# Patient Record
Sex: FEMALE | Race: OTHER | NOT HISPANIC OR LATINO | URBAN - METROPOLITAN AREA
[De-identification: names, ages, dates, MRNs, and addresses within clinical notes are randomized per-mention and may not be internally consistent; named-entity substitution may affect disease eponyms.]

---

## 2020-04-21 ENCOUNTER — EMERGENCY (EMERGENCY)
Facility: HOSPITAL | Age: 70
LOS: 1 days | Discharge: HOME | End: 2020-04-21
Attending: EMERGENCY MEDICINE
Payer: COMMERCIAL

## 2020-04-21 VITALS
RESPIRATION RATE: 16 BRPM | HEIGHT: 68 IN | HEART RATE: 66 BPM | TEMPERATURE: 98 F | OXYGEN SATURATION: 98 % | DIASTOLIC BLOOD PRESSURE: 68 MMHG | WEIGHT: 138.89 LBS | SYSTOLIC BLOOD PRESSURE: 134 MMHG

## 2020-04-21 PROCEDURE — 90471 IMMUNIZATION ADMIN: CPT

## 2020-04-21 PROCEDURE — 99284 EMERGENCY DEPT VISIT MOD MDM: CPT | Mod: 25

## 2020-04-21 PROCEDURE — 72125 CT NECK SPINE W/O DYE: CPT

## 2020-04-21 PROCEDURE — 99285 EMERGENCY DEPT VISIT HI MDM: CPT | Mod: 25

## 2020-04-21 PROCEDURE — 12002 RPR S/N/AX/GEN/TRNK2.6-7.5CM: CPT

## 2020-04-21 PROCEDURE — 70450 CT HEAD/BRAIN W/O DYE: CPT

## 2020-04-21 PROCEDURE — 72125 CT NECK SPINE W/O DYE: CPT | Mod: 26

## 2020-04-21 PROCEDURE — 70450 CT HEAD/BRAIN W/O DYE: CPT | Mod: 26

## 2020-04-21 PROCEDURE — 99053 MED SERV 10PM-8AM 24 HR FAC: CPT

## 2020-04-21 PROCEDURE — 90715 TDAP VACCINE 7 YRS/> IM: CPT

## 2020-04-21 RX ORDER — TETANUS TOXOID, REDUCED DIPHTHERIA TOXOID AND ACELLULAR PERTUSSIS VACCINE, ADSORBED 5; 2.5; 8; 8; 2.5 [IU]/.5ML; [IU]/.5ML; UG/.5ML; UG/.5ML; UG/.5ML
0.5 SUSPENSION INTRAMUSCULAR ONCE
Refills: 0 | Status: COMPLETED | OUTPATIENT
Start: 2020-04-21 | End: 2020-04-21

## 2020-04-21 RX ADMIN — TETANUS TOXOID, REDUCED DIPHTHERIA TOXOID AND ACELLULAR PERTUSSIS VACCINE, ADSORBED 0.5 MILLILITER(S): 5; 2.5; 8; 8; 2.5 SUSPENSION INTRAMUSCULAR at 23:46

## 2020-04-21 NOTE — ED PROVIDER NOTE - CLINICAL SUMMARY MEDICAL DECISION MAKING FREE TEXT BOX
Angelo: Patient s/p trip and fall backwards at home 8 hours prior. no n/v. no dizziness. no preceeding symptoms. laceration to back of head with minimal bleeding. no loc. not on AC. will get ct head/c-spine, tetanus, staples for laceration to scalp, d/c home likely. no focal findings. GCS 15.

## 2020-04-21 NOTE — ED PROVIDER NOTE - MUSCULOSKELETAL, MLM
No midline spinal tenderness, NEXUS negative.  Chest and pelvis non tender and stable.  b/l UE's with full ROM and non tender throughout.  b/l LE's full ROM NT throughout.

## 2020-04-21 NOTE — ED ADULT NURSE NOTE - OBJECTIVE STATEMENT
70 yo f arrived to the ed c/o mechanical trip and fall with lac to the back on the head; no loc, not on blood thinners

## 2020-04-21 NOTE — ED PROVIDER NOTE - NSFOLLOWUPINSTRUCTIONS_ED_ALL_ED_FT
1- Keep area dry for 24 hours, after was gently with soap and water  2- Return to ER, Urgent Care, or your primary doctor for staple removal in 7 days  3- Tylenol as needed for pain  4- If you have any redness, pus, fevers, chills, worsening pain, numbness/weakness, nausea/vomtiing, or any other concerns come back to the ER immediately

## 2020-04-21 NOTE — ED PROVIDER NOTE - OBJECTIVE STATEMENT
69 y.o. female no PMHx coming in with a laceration to the back of her head after a mechanical trip and fall around 330PM today.  Pt was in her home was cleaning, fell backwards and hit the back of her head on a door jam./  No LOC.  No other injury.  NO headaches, visual changes, nausea or vomiting.  No neck or back pain.  No numbness or weakness.  Had gotten bleeding under control with bandage but was concerned about the size of the laceration and brought her to the ED.  Unsure of last tetanus.

## 2023-12-07 ENCOUNTER — EMERGENCY (EMERGENCY)
Facility: HOSPITAL | Age: 73
LOS: 1 days | Discharge: HOME | End: 2023-12-07
Attending: EMERGENCY MEDICINE
Payer: COMMERCIAL

## 2023-12-07 VITALS
OXYGEN SATURATION: 97 % | TEMPERATURE: 98 F | SYSTOLIC BLOOD PRESSURE: 116 MMHG | RESPIRATION RATE: 16 BRPM | HEART RATE: 70 BPM | DIASTOLIC BLOOD PRESSURE: 75 MMHG

## 2023-12-07 VITALS
RESPIRATION RATE: 18 BRPM | WEIGHT: 143.08 LBS | TEMPERATURE: 97 F | HEART RATE: 66 BPM | DIASTOLIC BLOOD PRESSURE: 80 MMHG | SYSTOLIC BLOOD PRESSURE: 136 MMHG | HEIGHT: 68 IN | OXYGEN SATURATION: 99 %

## 2023-12-07 PROCEDURE — 25605 CLTX DST RDL FX/EPHYS SEP W/: CPT | Mod: LT

## 2023-12-07 PROCEDURE — 99285 EMERGENCY DEPT VISIT HI MDM: CPT

## 2023-12-07 PROCEDURE — 73080 X-RAY EXAM OF ELBOW: CPT | Mod: 26,LT

## 2023-12-07 PROCEDURE — 73080 X-RAY EXAM OF ELBOW: CPT

## 2023-12-07 PROCEDURE — 73090 X-RAY EXAM OF FOREARM: CPT

## 2023-12-07 PROCEDURE — 73120 X-RAY EXAM OF HAND: CPT

## 2023-12-07 PROCEDURE — 73090 X-RAY EXAM OF FOREARM: CPT | Mod: 26,LT,76

## 2023-12-07 PROCEDURE — 73110 X-RAY EXAM OF WRIST: CPT

## 2023-12-07 PROCEDURE — 73110 X-RAY EXAM OF WRIST: CPT | Mod: 26,LT,76

## 2023-12-07 PROCEDURE — 99285 EMERGENCY DEPT VISIT HI MDM: CPT | Mod: 25

## 2023-12-07 PROCEDURE — 73120 X-RAY EXAM OF HAND: CPT | Mod: 26,LT

## 2023-12-07 RX ORDER — ACETAMINOPHEN 500 MG
975 TABLET ORAL ONCE
Refills: 0 | Status: COMPLETED | OUTPATIENT
Start: 2023-12-07 | End: 2023-12-07

## 2023-12-07 RX ORDER — LIDOCAINE HYDROCHLORIDE AND EPINEPHRINE 10; 10 MG/ML; UG/ML
10 INJECTION, SOLUTION INFILTRATION; PERINEURAL ONCE
Refills: 0 | Status: DISCONTINUED | OUTPATIENT
Start: 2023-12-07 | End: 2023-12-11

## 2023-12-07 RX ADMIN — Medication 975 MILLIGRAM(S): at 16:18

## 2023-12-07 NOTE — ED PROVIDER NOTE - PHYSICAL EXAMINATION
CONSTITUTIONAL: awake; in no acute distress.   SKIN: warm, dry, no abrasions or lacerations  HEAD: Normocephalic; atraumatic.  EYES: no conjunctival injection. PERRL. no scleral icterus  ENT: No nasal discharge; airway clear.  NECK: Supple; non tender.  CARD: S1, S2 normal; no murmurs, gallops, or rubs. Regular rate and rhythm.   RESP: No wheezes, rales or rhonchi. Good air movement bilaterally.   ABD: soft ntnd, no guarding, no distention, no rigidity.   MSK: Extremities sig for R wrist with pain to lateral wrist including snuffbox, without overlying skin change, remainder of extremities without bony deformity or tenderness to palpation, full range of motion active and passive, with intact pulses, sensation, strength throughout.  Chest wall with clavicles intact, no rib tenderness or bony deformity.  Pelvis stable without tenderness.  Midline cervical, thoracic, lumbar spine without tenderness or step-offs.   NEURO: alert, oriented to ppte, cn 2-12 intact, motor strength 5/5 throughout, sensation intact throughout, coordination intact, gait normal   PSYCH: Cooperative, appropriate.

## 2023-12-07 NOTE — ED PROVIDER NOTE - CCCP TRG CHIEF CMPLNT
L hand inj [FreeTextEntry1] : VISION SCREENING\par HEALTHY DIET AND LIFESTYLE MODIFICATIONS\par HEALTHY WEIGHT LOSS \par CHECK ROUTINE LAB WORK\par VACCINATIONS REVIEWED: FLU, COVID, TDAP, SHINGRIX\par FOLLOW-UP ALL SPECIALISTS AS DIRECTED \par \par CONCERN FOR INFECTION FINGER DISCUSSED; ARRANGED FOR STAT APPOINTMENT WITH HAND SURGEON TOMORROW.  TO ER SOONER WITH ANY WORSENING, FEVER, ETC.  WARM SOAKS.  KEEP CLEAN AND DRY\par DERMATOLOGY EVALUATION OF PROBABLE WARTS FINGERS\par MONITOR LIPIDS AND BLOOD PRESSURE\par CARDIOLOGY CONSULTANT NOTE APPRECIATED AS WELL AS PRIOR LABS AND IMAGING\par ASTHMA ACTION PLAN REVIEWED\par NEED MOST RECENT MAMMOGRAM AND COLONOSCOPY\par CALL WITH ANY QUESTIONS, CONCERNS OR CHANGES

## 2023-12-07 NOTE — ED PROVIDER NOTE - PROGRESS NOTE DETAILS
Leandro Marino MD PGY2: Ortho at bedside for reduction. Leandro Marino MD PGY2: CT performed. Ortho seen images, rec outpt follow up Daubs. Discussed with pt, dc, follow up, return precautions. Understands and is amenable at this time Leandro Marino MD PGY2: Ortho reduced at bedside. Post reduction XR performed. Ortho seen images, rec outpt follow up Daubs. Discussed with pt, dc, follow up, return precautions. Understands and is amenable at this time

## 2023-12-07 NOTE — ED PROVIDER NOTE - CARE PROVIDERS DIRECT ADDRESSES
Spoke with patient regarding message below. Patient feels that applying the 2 pain patches at the same time has really helped her pain level. She would like to know if it is okay for her to continue doing this long term since it is helping? Or if you only recommend this for a short period of time? Please advise.     Patient aware PCP gone for the day and is okay with waiting until next week for a call back.    No - the patient is unable to be screened due to medical condition ,DirectAddress_Unknown

## 2023-12-07 NOTE — ED PROVIDER NOTE - OBJECTIVE STATEMENT
Patient is a 73-year-old female past medical history CLL presenting for fall and head injury.  Patient states that she was at [place] approximately 1 to 1-1/2 hours prior to arrival in the emergency department and she fell when tripping over the carpet/rubber of the floor and fell onto her left hand.  States that she did not head, did not lose consciousness, had initial pain to her left hand and nowhere else.  Was able to get up and walk without any lower extremity or hip pain.  Now states that pain is worse to the thumb side of the back of her left wrist.  That she can feel and move her fingers though it causes her pain in her wrist.  No tingling.  Otherwise no pain to other extremities, trunk, hips, neck, head.  No vision changes, nausea, vomiting, dizziness, weakness. Patient is a 73-year-old female past medical history CLL presenting for fall and head injury.  Patient states that she was at [place] approximately 1 to 1-1/2 hours prior to arrival in the emergency department and she fell when tripping over the carpet/rubber of the floor and fell onto her left hand.  States that she did not head, did not lose consciousness, had initial pain to her left hand and nowhere else.  Was able to get up and walk without any lower extremity or hip pain.  Now states that pain is worse to the thumb side of the back of her left wrist.  That she can feel and move her fingers though it causes her pain in her wrist.  No tingling.  Otherwise no pain to other extremities, trunk, hips, neck, head.  No vision changes, nausea, vomiting, dizziness, weakness.

## 2023-12-07 NOTE — ED PROVIDER NOTE - ATTENDING CONTRIBUTION TO CARE
73-year-old female past medical history CLL presenting for fall and head injury.  Patient had a mechanical fall at the [employer] complaining of left-sided wrist pain with deformity noted rule out fracture neurovascular intact analgesia prescribed likely need splinting versus Ortho for reduction.

## 2023-12-07 NOTE — ED PROVIDER NOTE - CARE PROVIDER_API CALL
Sam Boyer  Orthopaedic Surgery  [address], [address] [place] [address]  Phone: [phone]  Fax: [phone]  Follow Up Time:    Sam Boyer  Orthopaedic Surgery  [address] [facility] [place] [address]  Phone: [phone]  Fax: [phone]  Follow Up Time:

## 2023-12-07 NOTE — ED PROVIDER NOTE - CLINICAL SUMMARY MEDICAL DECISION MAKING FREE TEXT BOX
Patient is a 73-year-old female past medical history CLL presenting for fall and hand injury.  Currently with vital signs within normal limits and stable.  Presenting for fall onto her outstretched right hand, now with right wrist pain, distal extremity is neurovascularly intact with good pulses and good sensation.  Without any obvious traumatic findings elsewhere on exam.  Mechanical fall without any concern for syncope or seizure.  Has not taken analgesia yet, to give analgesia.  To eval with x-ray, for fracture versus dislocation, regardless of results with pain to snuffbox we will put in splint and give Ortho follow-up.

## 2023-12-07 NOTE — ED PROVIDER NOTE - PATIENT PORTAL LINK FT
You can access the FollowMyHealth Patient Portal offered by [facility] by registering at the following website: [email or web address]. By joining "[employer]’s FollowMyHealth portal, you will also be able to view your health information using other applications (apps) compatible with our system. You can access the FollowMyHealth Patient Portal offered by [facility] by registering at the following website: [email or web address]. By joining [employer]’s FollowMyHealth portal, you will also be able to view your health information using other applications (apps) compatible with our system.

## 2023-12-07 NOTE — CONSULT NOTE ADULT - NSCONSULTADDITIONALINFOA_GEN_ALL_CORE
I agree with the above note. All pertinent films have been reviewed. Please refer to clinical documentation of the history, physical examinations, data summary, and both assessment and plan as documented above and with which I agree.    Sam Boyer MD  Attending Orthopedic Surgeon

## 2023-12-07 NOTE — CONSULT NOTE ADULT - SUBJECTIVE AND OBJECTIVE BOX
73yFemale RHD c/o L wrist pain s/p MF over carpet while shopping. Patient denies head hit or LOC. Patient denies numbness or tingling in the LUE. Patient denies any other injuries.    PMH:    PSH:    AH:  No Known Allergies    Meds: See med rec    T(C): 36.6 (12-07-23 @ 16:20)  HR: 70 (12-07-23 @ 16:20)  BP: 116/75 (12-07-23 @ 16:20)  RR: 16 (12-07-23 @ 16:20)  SpO2: 97% (12-07-23 @ 16:20)  Wt(kg): --    Gen: NAD  PE LUE:  Skin intact,   visible deformity of wrist,   SILT med/rad/ulnar/axillary  +AIN/PIN/Ulnar/Radial/Musc/Median,   +shoulder/elbow ROM,   wrist ROM limited 2/2 pain,   +radial pulse, soft compartments,    Secondary:  No TTP over bony landmarks, SILT BL, ROM intact BL, distal pulses palpable.    Imaging:  XR demonstrating L distal radius fracture    Procedure:   Under aseptic conditions, a hematoma block was administered to the fracture site using 10cc of 1% lidocaine. Closed reduction was performed and a well molded plaster splint was applied. The patient tolerated the procedure well and there were no complications. The patient was neurovascularly intact following reduction. Post-reduction X-rays demonstrated acceptable alignment    73yFemale with L distal radius fracture sp reduction and splinting    pain control  NWB in sugarton in sling  PT/OT  No acute orthopaedic intervention  Ortho to sign off  Please call if you have further question  f/u w Dr. Boyer in 1-2 weeks    Splint precautions:  Keep cast dry  Elevate extremity, can try and ice through the splint  Do not stick anything into the splint  Monitor for signs of pressure build up from swelling: pain not controlled with medications, severe pain when moves the fingers/toes, numbness/tingling      Ortho 1331  
details…

## 2023-12-07 NOTE — ED PROVIDER NOTE - NSFOLLOWUPINSTRUCTIONS_ED_ALL_ED_FT
You were seen in the ED for injuries following a fall    Your examination and x-rays showed no findings requiring further evaluation or treatment in the hospital at this time.    Please follow up with your PCP as discussed within 1 week.  Discuss your symptoms and medications    You may take Tylenol up to 1000mg every 6 hours as needed for pain.    You were placed in a splint for your injury.  Please leave this on and do not get it wet until you are seen by orthopedics.  You may use a cast bag when showering to keep it dry.  Please follow-up with orthopedics within 1 to 2 weeks and discuss your injury.    Seek immediate medical attention if you experience new or worsening symptoms, inability to feel or move the fingers of your affected hand, fevers with worsening joint pain. You were seen in the ED for injuries following a fall    Your examination and x-rays showed no findings requiring further evaluation or treatment in the hospital at this time. You had a fractured radius, one of the long bones of your forearm.    Please follow up with your PCP as discussed within 1 week.  Discuss your symptoms and medications. Follow up with orthopedics Dr. Boyer within 1 week.    You may take Tylenol up to 1000mg every 6 hours as needed for pain.    You were placed in a splint for your injury.  Please leave this on and do not get it wet until you are seen by orthopedics.  You may use a cast bag when showering to keep it dry.     Seek immediate medical attention if you experience new or worsening symptoms, inability to feel or move the fingers of your affected hand, fevers with worsening joint pain.

## 2023-12-07 NOTE — ED ADULT NURSE NOTE - OBJECTIVE STATEMENT
Pt is 72 y/o female, presenting to the ED s/p fall onto L wrist. Pt reports that she was shopping when she tripped on rubber mat and fell onto L wrist. Pt denies head trauma and LOC. Pt noted to have swelling to Left wrist, ice pack applied. PMH of CLL. Pt able to walk s/p fall. Upon assessment, pt AxOx3, sitting up in stretcher and speaking in full sentences. Breathing spontaneously and unlabored, >95% RA. Abdomen soft and nontender to palpation. Skin is warm, dry, and intact w/ + peripheral pulses. Pt denies SOB, chest pain, n/v/d, dizziness, vision changes, chills, and fever. Safety and comfort measures provided- bed in lowest position, locked, and blanket given.

## 2023-12-08 PROBLEM — Z00.00 ENCOUNTER FOR PREVENTIVE HEALTH EXAMINATION: Status: ACTIVE | Noted: 2023-12-08

## 2023-12-11 ENCOUNTER — APPOINTMENT (OUTPATIENT)
Dept: ORTHOPEDIC SURGERY | Facility: CLINIC | Age: 73
End: 2023-12-11
Payer: COMMERCIAL

## 2023-12-11 VITALS — HEIGHT: 68 IN | BODY MASS INDEX: 21.67 KG/M2 | WEIGHT: 143 LBS

## 2023-12-11 DIAGNOSIS — Z78.9 OTHER SPECIFIED HEALTH STATUS: ICD-10-CM

## 2023-12-11 PROCEDURE — 99203 OFFICE O/P NEW LOW 30 MIN: CPT | Mod: 57

## 2023-12-11 PROCEDURE — L3984 UPPER EXT FX ORTHOSIS WRIST: CPT | Mod: LT

## 2023-12-11 PROCEDURE — 25600 CLTX DST RDL FX/EPHYS SEP WO: CPT | Mod: LT

## 2023-12-15 ENCOUNTER — APPOINTMENT (OUTPATIENT)
Dept: ORTHOPEDIC SURGERY | Facility: CLINIC | Age: 73
End: 2023-12-15

## 2024-01-05 ENCOUNTER — APPOINTMENT (OUTPATIENT)
Dept: ORTHOPEDIC SURGERY | Facility: CLINIC | Age: 74
End: 2024-01-05
Payer: COMMERCIAL

## 2024-01-05 VITALS — WEIGHT: 143 LBS | BODY MASS INDEX: 21.67 KG/M2 | HEIGHT: 68 IN

## 2024-01-05 PROCEDURE — 99024 POSTOP FOLLOW-UP VISIT: CPT

## 2024-01-05 PROCEDURE — 73110 X-RAY EXAM OF WRIST: CPT | Mod: LT

## 2024-01-05 PROCEDURE — L3908: CPT | Mod: LT

## 2024-01-05 NOTE — HISTORY OF PRESENT ILLNESS
[5] : 5 [4] : 4 [Dull/Aching] : dull/aching [de-identified] : L  fracture  Exos for 3 weeks She is feeling better  [FreeTextEntry1] : L wrist  [de-identified] : exos

## 2024-01-05 NOTE — PHYSICAL EXAM
[de-identified] : L wrist Min swelling Stiffness Nontender NVI  Xrays well aligned, healing DR fracture

## 2024-01-19 ENCOUNTER — APPOINTMENT (OUTPATIENT)
Dept: ORTHOPEDIC SURGERY | Facility: CLINIC | Age: 74
End: 2024-01-19
Payer: COMMERCIAL

## 2024-01-19 VITALS — WEIGHT: 143 LBS | HEIGHT: 68 IN | BODY MASS INDEX: 21.67 KG/M2

## 2024-01-19 PROCEDURE — 99024 POSTOP FOLLOW-UP VISIT: CPT

## 2024-01-19 PROCEDURE — 73110 X-RAY EXAM OF WRIST: CPT | Mod: LT

## 2024-01-19 NOTE — HISTORY OF PRESENT ILLNESS
[Dull/Aching] : dull/aching [de-identified] : L wirst DR fracture She is better with therapy  [4] : 4 [3] : 3 [FreeTextEntry1] : L wrist  [de-identified] : brace, therapy

## 2024-01-19 NOTE — PHYSICAL EXAM
[de-identified] : L wrist Min swelling Min tender TFCC Improved wrist ROM NVI  Xrays healed fracture well aligned

## 2024-02-09 ENCOUNTER — APPOINTMENT (OUTPATIENT)
Dept: ORTHOPEDIC SURGERY | Facility: CLINIC | Age: 74
End: 2024-02-09
Payer: COMMERCIAL

## 2024-02-09 VITALS — WEIGHT: 143 LBS | BODY MASS INDEX: 21.67 KG/M2 | HEIGHT: 68 IN

## 2024-02-09 PROCEDURE — 99024 POSTOP FOLLOW-UP VISIT: CPT

## 2024-02-09 NOTE — HISTORY OF PRESENT ILLNESS
[4] : 4 [3] : 3 [Dull/Aching] : dull/aching [de-identified] : L  fracture  Doing well with therapy  [FreeTextEntry1] : L wrist  [de-identified] : therapy, brace

## 2024-03-08 ENCOUNTER — APPOINTMENT (OUTPATIENT)
Dept: ORTHOPEDIC SURGERY | Facility: CLINIC | Age: 74
End: 2024-03-08
Payer: COMMERCIAL

## 2024-03-08 VITALS — BODY MASS INDEX: 21.67 KG/M2 | HEIGHT: 68 IN | WEIGHT: 143 LBS

## 2024-03-08 DIAGNOSIS — S52.532A COLLES' FRACTURE OF LEFT RADIUS, INITIAL ENCOUNTER FOR CLOSED FRACTURE: ICD-10-CM

## 2024-03-08 PROCEDURE — 99024 POSTOP FOLLOW-UP VISIT: CPT

## 2024-03-08 NOTE — HISTORY OF PRESENT ILLNESS
[de-identified] : L DR fracture  She is better with therapy  [5] : 5 [2] : 2 [Dull/Aching] : dull/aching [FreeTextEntry1] : L wrist  [de-identified] : therapy

## 2024-04-05 ENCOUNTER — APPOINTMENT (OUTPATIENT)
Dept: ORTHOPEDIC SURGERY | Facility: CLINIC | Age: 74
End: 2024-04-05
